# Patient Record
Sex: FEMALE | Race: WHITE | NOT HISPANIC OR LATINO | Employment: FULL TIME | URBAN - NONMETROPOLITAN AREA
[De-identification: names, ages, dates, MRNs, and addresses within clinical notes are randomized per-mention and may not be internally consistent; named-entity substitution may affect disease eponyms.]

---

## 2017-01-13 RX ORDER — DROSPIRENONE AND ETHINYL ESTRADIOL 0.03MG-3MG
1 KIT ORAL DAILY
Qty: 28 TABLET | Refills: 6 | Status: SHIPPED | OUTPATIENT
Start: 2017-01-13 | End: 2022-09-13

## 2021-09-09 ENCOUNTER — OFFICE VISIT (OUTPATIENT)
Dept: OBSTETRICS AND GYNECOLOGY | Facility: CLINIC | Age: 41
End: 2021-09-09

## 2021-09-09 VITALS
BODY MASS INDEX: 24.1 KG/M2 | HEIGHT: 63 IN | WEIGHT: 136 LBS | DIASTOLIC BLOOD PRESSURE: 74 MMHG | SYSTOLIC BLOOD PRESSURE: 106 MMHG

## 2021-09-09 DIAGNOSIS — Z78.9 NONSMOKER: ICD-10-CM

## 2021-09-09 DIAGNOSIS — B96.89 BACTERIAL VAGINAL INFECTION: Primary | ICD-10-CM

## 2021-09-09 DIAGNOSIS — N76.0 BACTERIAL VAGINAL INFECTION: Primary | ICD-10-CM

## 2021-09-09 DIAGNOSIS — Z12.4 CERVICAL CANCER SCREENING: ICD-10-CM

## 2021-09-09 PROCEDURE — G0123 SCREEN CERV/VAG THIN LAYER: HCPCS | Performed by: OBSTETRICS & GYNECOLOGY

## 2021-09-09 PROCEDURE — 87624 HPV HI-RISK TYP POOLED RSLT: CPT | Performed by: OBSTETRICS & GYNECOLOGY

## 2021-09-09 PROCEDURE — 99202 OFFICE O/P NEW SF 15 MIN: CPT | Performed by: OBSTETRICS & GYNECOLOGY

## 2021-09-09 RX ORDER — NITROFURANTOIN 25; 75 MG/1; MG/1
100 CAPSULE ORAL DAILY
COMMUNITY
Start: 2021-08-03 | End: 2022-09-13

## 2021-09-09 RX ORDER — VENLAFAXINE 75 MG/1
75 TABLET ORAL DAILY
COMMUNITY
End: 2022-09-13

## 2021-09-09 NOTE — PROGRESS NOTES
"Subjective   Chief Complaint   Patient presents with   • Vaginal Discharge     pt here to f/u on recurring yeast and bacterial infections, c/o mostly of vaginal odor, has been on Diflucan and Flagyl with good improvement,  reports it is worsened with intercourse     Rosio Kingston is a 40 y.o. year old .  Patient's last menstrual period was 2021 (exact date).  She presents to be seen because of recurring vaginal infections.  Patient has a new boyfriend and reports that she got BV after they had been sexually active for about two months.  Patient says that it is better if she does not allow boyfriend to ejaculate inside her.  She was treated, and has subsequently had BV two more times.  She also reports that every time she takes antibiotics for the BV, she then ends up with a yeast infection.    Patient is not having any symptoms right now, but will have burning, itching, and a swollen feeling every time she has an infection.     Patient reports periods are normal.  Menses are regular on OCP's, with no breakthrough bleeding.    The following portions of the patient's history were reviewed and updated as appropriate:current medications and allergies    Social History    Tobacco Use      Smoking status: Never Smoker      Smokeless tobacco: Never Used    Review of Systems   Constitutional: Negative for activity change and unexpected weight change.   Respiratory: Negative for shortness of breath.    Cardiovascular: Negative for chest pain.   Genitourinary: Negative for dyspareunia, frequency, urgency, vaginal bleeding, vaginal discharge and vaginal pain.         Objective   /74 (BP Location: Right arm, Patient Position: Sitting, Cuff Size: Adult)   Ht 160 cm (63\")   Wt 61.7 kg (136 lb)   LMP 2021 (Exact Date)   Breastfeeding No   BMI 24.09 kg/m²     Physical Exam  Vitals and nursing note reviewed. Exam conducted with a chaperone present.   Constitutional:       General: She is not in " acute distress.     Appearance: She is well-developed.   HENT:      Head: Normocephalic and atraumatic.   Neck:      Thyroid: No thyromegaly.   Pulmonary:      Effort: Pulmonary effort is normal.   Abdominal:      General: There is no distension.      Palpations: Abdomen is soft.      Tenderness: There is no abdominal tenderness.   Genitourinary:     General: Normal vulva.      Exam position: Lithotomy position.      Labia:         Right: No rash or tenderness.         Left: No rash or tenderness.       Urethra: No prolapse.      Vagina: Normal.      Cervix: Normal.      Uterus: Normal.       Adnexa: Right adnexa normal and left adnexa normal.   Musculoskeletal:         General: Normal range of motion.      Cervical back: Normal range of motion.   Skin:     General: Skin is warm and dry.   Neurological:      Mental Status: She is alert and oriented to person, place, and time.   Psychiatric:         Behavior: Behavior normal.         Judgment: Judgment normal.         Lab Review   No data reviewed    Imaging   No data reviewed     Assessment & Plan  Diagnoses and all orders for this visit:    1. Bacterial vaginal infection (Primary): Patient with recurrent bacterial infections that seem to be completely resolved since her boyfriend started withdrawing.  They are both satisfied with this resolution, although we have discussed vaginal pH suppositories to help with the problem.  The patient has already wearing only cotton underwear, she does not soak in hot water or a hot tub, and she generally wears loose clothes since she is in scrubs all day at work.  General vaginal health discussed, and questions answered.  The patient would like to be seen here annually for GYN care; although she denies any history of abnormal Pap smears, a new Pap was collected today just that we would have one in our system.  If that Pap also returns is normal, she will only need Pap smears every 3 years.  The patient is scheduling an  appointment to return to the office in 1 year for an annual exam.    2. BMI 24.0-24.9, adult    3. Nonsmoker        This note was electronically signed.    Cassie Carnes MD  September 9, 2021  08:55 CDT    Total time spent today with Rosio  was 15-29 minutes (level 2).  Greater than 50% of the time was spent coordinating care, answering her questions and counseling regarding pathophysiology of her presenting problem along with plans for any diagnositc work-up and treatment.

## 2021-09-13 ENCOUNTER — TELEPHONE (OUTPATIENT)
Dept: OBSTETRICS AND GYNECOLOGY | Facility: CLINIC | Age: 41
End: 2021-09-13

## 2021-09-13 LAB
GEN CATEG CVX/VAG CYTO-IMP: NORMAL
HPV I/H RISK 4 DNA CVX QL PROBE+SIG AMP: NOT DETECTED
LAB AP CASE REPORT: NORMAL
LAB AP GYN ADDITIONAL INFORMATION: NORMAL
LAB AP GYN OTHER FINDINGS: NORMAL
PATH INTERP SPEC-IMP: NORMAL
STAT OF ADQ CVX/VAG CYTO-IMP: NORMAL

## 2021-09-13 NOTE — TELEPHONE ENCOUNTER
Pt called office with c/o vaginal itching and discharge. Pt states that she just had a pap and was wanting to get medication sent in. Pt advised to get otc monistat and see if that takes care of the problem. Pt agrees and voices understanding.

## 2021-09-15 ENCOUNTER — TELEPHONE (OUTPATIENT)
Dept: OBSTETRICS AND GYNECOLOGY | Facility: CLINIC | Age: 41
End: 2021-09-15

## 2021-09-16 DIAGNOSIS — B37.31 CANDIDIASIS, VAGINA: Primary | ICD-10-CM

## 2021-09-16 RX ORDER — FLUCONAZOLE 150 MG/1
150 TABLET ORAL DAILY
Qty: 1 TABLET | Refills: 0 | Status: SHIPPED | OUTPATIENT
Start: 2021-09-16 | End: 2022-09-13

## 2022-09-13 ENCOUNTER — OFFICE VISIT (OUTPATIENT)
Dept: OBSTETRICS AND GYNECOLOGY | Facility: CLINIC | Age: 42
End: 2022-09-13

## 2022-09-13 VITALS
SYSTOLIC BLOOD PRESSURE: 102 MMHG | BODY MASS INDEX: 23.74 KG/M2 | HEIGHT: 63 IN | WEIGHT: 134 LBS | DIASTOLIC BLOOD PRESSURE: 74 MMHG

## 2022-09-13 DIAGNOSIS — Z12.31 BREAST CANCER SCREENING BY MAMMOGRAM: ICD-10-CM

## 2022-09-13 DIAGNOSIS — Z78.9 NONSMOKER: ICD-10-CM

## 2022-09-13 DIAGNOSIS — N91.2 AMENORRHEA: Primary | ICD-10-CM

## 2022-09-13 DIAGNOSIS — Z12.4 SCREENING FOR CERVICAL CANCER: ICD-10-CM

## 2022-09-13 DIAGNOSIS — F90.9 ATTENTION DEFICIT HYPERACTIVITY DISORDER (ADHD), UNSPECIFIED ADHD TYPE: ICD-10-CM

## 2022-09-13 DIAGNOSIS — Z01.419 ENCOUNTER FOR GYNECOLOGICAL EXAMINATION WITHOUT ABNORMAL FINDING: ICD-10-CM

## 2022-09-13 PROCEDURE — 99396 PREV VISIT EST AGE 40-64: CPT | Performed by: OBSTETRICS & GYNECOLOGY

## 2022-09-13 PROCEDURE — G0123 SCREEN CERV/VAG THIN LAYER: HCPCS | Performed by: OBSTETRICS & GYNECOLOGY

## 2022-09-13 PROCEDURE — 87624 HPV HI-RISK TYP POOLED RSLT: CPT | Performed by: OBSTETRICS & GYNECOLOGY

## 2022-09-13 RX ORDER — LISDEXAMFETAMINE DIMESYLATE 10 MG/1
CAPSULE ORAL
COMMUNITY
Start: 2022-09-02 | End: 2023-02-14

## 2022-09-13 NOTE — PROGRESS NOTES
Subjective      Rosio Kingston is a 41 y.o. female who presents for her routine annual exam. Overall, patient reports to be feeling well.  Patient weaned off of her Effexor and can tell that she has had increased anxiety.  She started the medication after her   in 2020 and did not feel like she needed it anymore.  She also stopped her combination OCP's (current partner has had a vasectomy).  She had hot flashes and night sweats at first, but that has resolved.  She has not had a period since 2022, three months ago.  While vasomotor symptoms have resolved, she does feel like mood swings have been terrible - and didn't know whether to attribute to menopause or quitting her medication.    Current contraception: vasectomy  Regular self breast exam: no  History of abnormal Pap smear: no  History of abnormal mammogram: no  Exercise: frequently - job requires a lot of walking    The following portions of the patient's history were reviewed and updated as appropriate: allergies, current medications, past family history, past medical history, past social history, past surgical history and problem list.    Heterosexual.  Has been with current partner just over a year.    Family History  Family History   Problem Relation Age of Onset   • Breast cancer Neg Hx    • Ovarian cancer Neg Hx    • Uterine cancer Neg Hx    • Colon cancer Neg Hx        Review of Systems  Review of Systems   Constitutional: Negative for activity change, unexpected weight gain and unexpected weight loss.   Respiratory: Negative for shortness of breath.    Cardiovascular: Negative for chest pain.   Gastrointestinal: Positive for blood in stool (bright red, from hemorrhoids, only occasional). Negative for abdominal distention, abdominal pain, constipation and diarrhea.   Endocrine: Negative for cold intolerance and heat intolerance.   Genitourinary: Positive for amenorrhea (for three months). Negative for decreased libido,  "difficulty urinating, dyspareunia, pelvic pain, urinary incontinence, vaginal discharge and vaginal pain.   Musculoskeletal: Negative for arthralgias and back pain.   Neurological: Positive for headache (just recently, but just started vyvanse and also has a new job). Negative for dizziness.   Psychiatric/Behavioral: Positive for agitation. Negative for depressed mood. The patient is nervous/anxious (mild, intermittent, worse in the evening).              Objective        /74   Ht 160 cm (63\")   Wt 60.8 kg (134 lb)   LMP 07/25/2022 (Exact Date)   BMI 23.74 kg/m²   Physical Exam  Vitals and nursing note reviewed. Exam conducted with a chaperone present.   Constitutional:       General: She is not in acute distress.     Appearance: She is well-developed.   HENT:      Head: Normocephalic and atraumatic.   Cardiovascular:      Rate and Rhythm: Normal rate and regular rhythm.      Heart sounds: No murmur heard.  Pulmonary:      Effort: Pulmonary effort is normal.      Breath sounds: Normal breath sounds.   Chest:   Breasts:      Right: No inverted nipple or mass.      Left: No inverted nipple or mass.       Abdominal:      General: There is no distension.      Palpations: Abdomen is soft.      Tenderness: There is no abdominal tenderness.   Genitourinary:     General: Normal vulva.      Exam position: Lithotomy position.      Labia:         Right: No tenderness or lesion.         Left: No tenderness or lesion.       Vagina: Normal. No vaginal discharge, tenderness or bleeding.      Cervix: No cervical motion tenderness, discharge or friability.      Adnexa:         Right: No tenderness or fullness.          Left: No tenderness or fullness.        Rectum: Normal.   Musculoskeletal:         General: Normal range of motion.      Cervical back: Normal range of motion and neck supple.   Skin:     General: Skin is warm and dry.   Neurological:      Mental Status: She is alert and oriented to person, place, and time. "   Psychiatric:         Behavior: Behavior normal.         Judgment: Judgment normal.               Assessment  & Plan    Diagnoses and all orders for this visit:    1. Amenorrhea (Primary): Patient without menses for 3 months.  While she initially had vasomotor symptoms, those have resolved; and now she simply reports increased mood swings and irritability.  Testing to see whether the patient might be menopausal  -     Estradiol  -     Follicle Stimulating Hormone  -     Progesterone    2. Attention deficit hyperactivity disorder (ADHD), unspecified ADHD type: Patient reports symptoms well controlled with Vyvanse that she started recently    3. Encounter for gynecological examination without abnormal finding: Physical exam unremarkable.  Regular self breast exam encouraged; mammogram ordered.  Routine screening labs and new Pap today.  Patient with some mild anxiety, but is not sure whether that has been related to hormonal changes or recently weaning off of her Effexor.  If the patient's labs show she is menopausal, we will start a low-dose of hormone replacement therapy.  If that is not the case, the patient wants to wait 1 or 2 more months and see if her mood levels out; if not, she will call back and we will try BuSpar at night before she goes to bed.  -     CBC & Differential  -     Comprehensive Metabolic Panel  -     Hemoglobin A1c  -     Lipid Panel  -     TSH    4. Breast cancer screening by mammogram  -     Mammo Screening Bilateral With CAD; Future    5. Nonsmoker    6. BMI 23.0-23.9, adult        This note was electronically signed.    Cassie Carnes MD  9/13/2022  09:15 CDT

## 2022-09-14 LAB
ALBUMIN SERPL-MCNC: 4.6 G/DL (ref 3.5–5.2)
ALBUMIN/GLOB SERPL: 2.1 G/DL
ALP SERPL-CCNC: 82 U/L (ref 39–117)
ALT SERPL-CCNC: 23 U/L (ref 1–33)
AST SERPL-CCNC: 16 U/L (ref 1–32)
BASOPHILS # BLD AUTO: 0.02 10*3/MM3 (ref 0–0.2)
BASOPHILS NFR BLD AUTO: 0.3 % (ref 0–1.5)
BILIRUB SERPL-MCNC: 0.6 MG/DL (ref 0–1.2)
BUN SERPL-MCNC: 11 MG/DL (ref 6–20)
BUN/CREAT SERPL: 13.1 (ref 7–25)
CALCIUM SERPL-MCNC: 10.1 MG/DL (ref 8.6–10.5)
CHLORIDE SERPL-SCNC: 103 MMOL/L (ref 98–107)
CHOLEST SERPL-MCNC: 160 MG/DL (ref 0–200)
CO2 SERPL-SCNC: 24.4 MMOL/L (ref 22–29)
CREAT SERPL-MCNC: 0.84 MG/DL (ref 0.57–1)
EGFRCR-CYS SERPLBLD CKD-EPI 2021: 89.7 ML/MIN/1.73
EOSINOPHIL # BLD AUTO: 0.29 10*3/MM3 (ref 0–0.4)
EOSINOPHIL NFR BLD AUTO: 4.4 % (ref 0.3–6.2)
ERYTHROCYTE [DISTWIDTH] IN BLOOD BY AUTOMATED COUNT: 12.2 % (ref 12.3–15.4)
ESTRADIOL SERPL-MCNC: 144 PG/ML
FSH SERPL-ACNC: 36 MIU/ML
GEN CATEG CVX/VAG CYTO-IMP: NORMAL
GLOBULIN SER CALC-MCNC: 2.2 GM/DL
GLUCOSE SERPL-MCNC: 79 MG/DL (ref 65–99)
HBA1C MFR BLD: 5.4 % (ref 4.8–5.6)
HCT VFR BLD AUTO: 42 % (ref 34–46.6)
HDLC SERPL-MCNC: 52 MG/DL (ref 40–60)
HGB BLD-MCNC: 13.5 G/DL (ref 12–15.9)
HPV I/H RISK 4 DNA CVX QL PROBE+SIG AMP: NOT DETECTED
IMM GRANULOCYTES # BLD AUTO: 0.02 10*3/MM3 (ref 0–0.05)
IMM GRANULOCYTES NFR BLD AUTO: 0.3 % (ref 0–0.5)
LAB AP CASE REPORT: NORMAL
LAB AP GYN ADDITIONAL INFORMATION: NORMAL
LDLC SERPL CALC-MCNC: 92 MG/DL (ref 0–100)
LYMPHOCYTES # BLD AUTO: 2.04 10*3/MM3 (ref 0.7–3.1)
LYMPHOCYTES NFR BLD AUTO: 31 % (ref 19.6–45.3)
Lab: NORMAL
MCH RBC QN AUTO: 29.3 PG (ref 26.6–33)
MCHC RBC AUTO-ENTMCNC: 32.1 G/DL (ref 31.5–35.7)
MCV RBC AUTO: 91.1 FL (ref 79–97)
MONOCYTES # BLD AUTO: 0.55 10*3/MM3 (ref 0.1–0.9)
MONOCYTES NFR BLD AUTO: 8.3 % (ref 5–12)
NEUTROPHILS # BLD AUTO: 3.67 10*3/MM3 (ref 1.7–7)
NEUTROPHILS NFR BLD AUTO: 55.7 % (ref 42.7–76)
NRBC BLD AUTO-RTO: 0 /100 WBC (ref 0–0.2)
PATH INTERP SPEC-IMP: NORMAL
PLATELET # BLD AUTO: 345 10*3/MM3 (ref 140–450)
POTASSIUM SERPL-SCNC: 4.4 MMOL/L (ref 3.5–5.2)
PROGEST SERPL-MCNC: 0.1 NG/ML
PROT SERPL-MCNC: 6.8 G/DL (ref 6–8.5)
RBC # BLD AUTO: 4.61 10*6/MM3 (ref 3.77–5.28)
SODIUM SERPL-SCNC: 139 MMOL/L (ref 136–145)
STAT OF ADQ CVX/VAG CYTO-IMP: NORMAL
TRIGL SERPL-MCNC: 86 MG/DL (ref 0–150)
TSH SERPL DL<=0.005 MIU/L-ACNC: 1.85 UIU/ML (ref 0.27–4.2)
VLDLC SERPL CALC-MCNC: 16 MG/DL (ref 5–40)
WBC # BLD AUTO: 6.59 10*3/MM3 (ref 3.4–10.8)

## 2022-09-28 ENCOUNTER — APPOINTMENT (OUTPATIENT)
Dept: MAMMOGRAPHY | Facility: HOSPITAL | Age: 42
End: 2022-09-28

## 2022-10-05 ENCOUNTER — APPOINTMENT (OUTPATIENT)
Dept: MAMMOGRAPHY | Facility: HOSPITAL | Age: 42
End: 2022-10-05

## 2022-10-12 ENCOUNTER — HOSPITAL ENCOUNTER (OUTPATIENT)
Dept: MAMMOGRAPHY | Facility: HOSPITAL | Age: 42
Discharge: HOME | End: 2022-10-12
Admitting: OBSTETRICS & GYNECOLOGY

## 2022-10-12 DIAGNOSIS — Z12.31 BREAST CANCER SCREENING BY MAMMOGRAM: ICD-10-CM

## 2022-10-12 DIAGNOSIS — N95.1 PERIMENOPAUSE: Primary | ICD-10-CM

## 2022-10-12 PROCEDURE — 77063 BREAST TOMOSYNTHESIS BI: CPT

## 2022-10-12 PROCEDURE — 77067 SCR MAMMO BI INCL CAD: CPT

## 2022-10-13 LAB
CORTIS SERPL-MCNC: 3 UG/DL
DHEA-S SERPL-MCNC: 64.8 UG/DL (ref 57.3–279.2)
ESTRADIOL SERPL-MCNC: 115 PG/ML
FSH SERPL-ACNC: 44.9 MIU/ML
LH SERPL-ACNC: 55.7 MIU/ML
PROGEST SERPL-MCNC: 0.2 NG/ML
TESTOST SERPL-MCNC: <3 NG/DL (ref 4–50)

## 2022-10-24 ENCOUNTER — HOSPITAL ENCOUNTER (OUTPATIENT)
Dept: ULTRASOUND IMAGING | Facility: HOSPITAL | Age: 42
Discharge: HOME | End: 2022-10-24
Admitting: OBSTETRICS & GYNECOLOGY

## 2022-10-24 DIAGNOSIS — R92.8 ABNORMAL MAMMOGRAM: ICD-10-CM

## 2022-10-24 PROCEDURE — 76642 ULTRASOUND BREAST LIMITED: CPT

## 2022-10-26 RX ORDER — NORETHINDRONE ACETATE AND ETHINYL ESTRADIOL AND FERROUS FUMARATE 1MG-20(24)
1 KIT ORAL DAILY
Qty: 28 TABLET | Refills: 12 | Status: SHIPPED | OUTPATIENT
Start: 2022-10-26 | End: 2023-02-14

## 2023-02-14 ENCOUNTER — OFFICE VISIT (OUTPATIENT)
Dept: OBSTETRICS AND GYNECOLOGY | Facility: CLINIC | Age: 43
End: 2023-02-14
Payer: COMMERCIAL

## 2023-02-14 VITALS
WEIGHT: 142 LBS | DIASTOLIC BLOOD PRESSURE: 72 MMHG | HEIGHT: 63 IN | BODY MASS INDEX: 25.16 KG/M2 | SYSTOLIC BLOOD PRESSURE: 98 MMHG

## 2023-02-14 DIAGNOSIS — N93.8 DUB (DYSFUNCTIONAL UTERINE BLEEDING): ICD-10-CM

## 2023-02-14 DIAGNOSIS — N89.8 VAGINAL IRRITATION: Primary | ICD-10-CM

## 2023-02-14 DIAGNOSIS — E66.3 OVERWEIGHT (BMI 25.0-29.9): ICD-10-CM

## 2023-02-14 PROCEDURE — 99213 OFFICE O/P EST LOW 20 MIN: CPT | Performed by: NURSE PRACTITIONER

## 2023-02-14 RX ORDER — CLONAZEPAM 0.5 MG/1
0.5 TABLET ORAL DAILY PRN
COMMUNITY
Start: 2023-01-24

## 2023-02-14 RX ORDER — PENICILLIN V POTASSIUM 500 MG/1
TABLET ORAL
COMMUNITY
Start: 2023-01-25 | End: 2023-03-06

## 2023-02-14 RX ORDER — FUROSEMIDE 20 MG/1
1 TABLET ORAL DAILY
COMMUNITY
Start: 2023-01-26

## 2023-02-14 RX ORDER — SUCRALFATE 1 G/1
TABLET ORAL
COMMUNITY
Start: 2023-01-19

## 2023-02-14 RX ORDER — TESTOSTERONE CYPIONATE, MICRO 100 %
POWDER (GRAM) MISCELLANEOUS
COMMUNITY
Start: 2022-11-11 | End: 2023-02-14

## 2023-02-14 RX ORDER — MULTIVIT WITH MINERALS/LUTEIN
250 TABLET ORAL DAILY
COMMUNITY

## 2023-02-14 RX ORDER — DEXTROAMPHETAMINE SACCHARATE, AMPHETAMINE ASPARTATE, DEXTROAMPHETAMINE SULFATE AND AMPHETAMINE SULFATE 2.5; 2.5; 2.5; 2.5 MG/1; MG/1; MG/1; MG/1
TABLET ORAL
COMMUNITY
Start: 2022-11-11 | End: 2023-03-06

## 2023-02-14 RX ORDER — ONDANSETRON 8 MG/1
TABLET, ORALLY DISINTEGRATING ORAL
COMMUNITY
Start: 2023-01-05

## 2023-02-14 RX ORDER — ESTRADIOL 1 MG/1
TABLET ORAL
COMMUNITY
Start: 2023-02-03 | End: 2023-03-06

## 2023-02-14 RX ORDER — ITRACONAZOLE 100 MG/1
CAPSULE ORAL
COMMUNITY
Start: 2023-01-25 | End: 2023-02-14

## 2023-02-14 RX ORDER — MULTIPLE VITAMINS W/ MINERALS TAB 9MG-400MCG
1 TAB ORAL DAILY
COMMUNITY

## 2023-02-14 RX ORDER — NYSTATIN AND TRIAMCINOLONE ACETONIDE 100000; 1 [USP'U]/G; MG/G
OINTMENT TOPICAL 2 TIMES DAILY
Qty: 30 G | Refills: 0 | Status: SHIPPED | OUTPATIENT
Start: 2023-02-14

## 2023-02-14 NOTE — PROGRESS NOTES
"Chief Complaint  Vaginitis (Pt is here with c/o a possible vaginal infection. Pt states that she has had back to back infections, different ones.  Pt currently c/o itching.  This has all been going on since Oct of last year, pt stopped birth control in Aug.  Did have a period in Nov last year but has not since.  )    Subjective          Rosio Kingston presents to [facility]  History of Present Illness  Possible vaginal infection  Itching since Oct.   Stopped OCP in Aug  Last period 11/2022.         Review of Systems   Constitutional: Negative for activity change, appetite change, fatigue and fever.   Respiratory: Negative for apnea and shortness of breath.    Cardiovascular: Negative for chest pain and palpitations.   Gastrointestinal: Negative for abdominal distention, abdominal pain, constipation, diarrhea, nausea and vomiting.   Endocrine: Negative for cold intolerance and heat intolerance.   Genitourinary: Negative for difficulty urinating, frequency, menstrual problem, pelvic pain and vaginal pain.        Off OCP in August  LMP 11/2022    Multiple types of vaginal infections.      Neurological: Negative for headaches.   Psychiatric/Behavioral: Negative for agitation and sleep disturbance.        Off effexor in approx August             Objective   Vital Signs:   BP 98/72   Ht 160 cm (63\")   Wt 64.4 kg (142 lb)   BMI 25.15 kg/m²     Physical Exam  Exam conducted with a chaperone present.   Constitutional:       Appearance: She is well-developed.   HENT:      Head: Normocephalic.   Neck:      Trachea: No tracheal deviation.   Cardiovascular:      Rate and Rhythm: Normal rate.   Pulmonary:      Breath sounds: Normal breath sounds. No wheezing.   Abdominal:      Palpations: Abdomen is soft.      Tenderness: There is no abdominal tenderness.   Genitourinary:     Exam position: Lithotomy position.      Labia:         Right: Tenderness present. No rash or lesion.         Left: " Tenderness present. No rash or lesion.       Vagina: Vaginal discharge and tenderness present. No erythema.      Cervix: No cervical motion tenderness or discharge.      Uterus: Not deviated, not enlarged and not tender.       Adnexa:         Right: No mass, tenderness or fullness.          Left: No mass, tenderness or fullness.        Rectum: Normal.      Comments: Clark discharge  Skin:     General: Skin is warm and dry.      Findings: No rash.   Neurological:      Mental Status: She is alert and oriented to person, place, and time.   Psychiatric:         Speech: Speech normal.         Behavior: Behavior normal.         Result Review :   The following data was reviewed by: GURVINDER Platt on 02/14/2023:  Multiple reports r/t vaginal infections.                   Assessment and Plan      LMP 11/29/2022  Pt reports she started on Estrace.   Pt states she was not prescribed progesterone.   Pt to return for US to evaluate endo thickness.     Discussed pt symptoms, hx of infections and treatment.   NuSwab collected for BV panel, Candida, Mycoplasma/Ureaplasma, and STD 3 testing.   Mycolog sent to pharmacy.      Diagnoses and all orders for this visit:    1. Vaginal irritation (Primary)  -     NuSwab VG+ - Swab, Vagina  -     Genital Mycoplasmas OMAR, Swab - Swab, Cervix    2. DUB (dysfunctional uterine bleeding)    3. Overweight (BMI 25.0-29.9)    Other orders  -     nystatin-triamcinolone (MYCOLOG) 073796-8.1 UNIT/GM-% ointment; Apply  topically to the appropriate area as directed 2 (Two) Times a Day.  Dispense: 30 g; Refill: 0          BMI is >= 25 and <30. (Overweight) The following options were offered after discussion;: weight loss educational material (shared in after visit summary)       Follow Up   Return for US and OV.    Patient was given instructions and counseling regarding her condition or for health maintenance advice. Please see specific information pulled into the AVS if appropriate.

## 2023-02-18 LAB
A VAGINAE DNA VAG QL NAA+PROBE: ABNORMAL SCORE
BVAB2 DNA VAG QL NAA+PROBE: ABNORMAL SCORE
C ALBICANS DNA VAG QL NAA+PROBE: POSITIVE
C GLABRATA DNA VAG QL NAA+PROBE: NEGATIVE
C TRACH DNA VAG QL NAA+PROBE: NEGATIVE
M GENITALIUM DNA SPEC QL NAA+PROBE: NEGATIVE
M HOMINIS DNA SPEC QL NAA+PROBE: NEGATIVE
MEGA1 DNA VAG QL NAA+PROBE: ABNORMAL SCORE
N GONORRHOEA DNA VAG QL NAA+PROBE: NEGATIVE
T VAGINALIS DNA VAG QL NAA+PROBE: NEGATIVE
UREAPLASMA DNA SPEC QL NAA+PROBE: NEGATIVE

## 2023-02-19 RX ORDER — FLUCONAZOLE 150 MG/1
150 TABLET ORAL EVERY OTHER DAY
Qty: 2 TABLET | Refills: 0 | Status: SHIPPED | OUTPATIENT
Start: 2023-02-19 | End: 2023-02-22

## 2023-02-19 NOTE — PATIENT INSTRUCTIONS

## 2023-03-06 ENCOUNTER — OFFICE VISIT (OUTPATIENT)
Dept: OBSTETRICS AND GYNECOLOGY | Facility: CLINIC | Age: 43
End: 2023-03-06
Payer: COMMERCIAL

## 2023-03-06 VITALS
SYSTOLIC BLOOD PRESSURE: 116 MMHG | WEIGHT: 139 LBS | DIASTOLIC BLOOD PRESSURE: 74 MMHG | BODY MASS INDEX: 24.63 KG/M2 | HEIGHT: 63 IN

## 2023-03-06 DIAGNOSIS — Z30.019 ENCOUNTER FOR INITIAL PRESCRIPTION OF CONTRACEPTIVES, UNSPECIFIED CONTRACEPTIVE: ICD-10-CM

## 2023-03-06 DIAGNOSIS — N89.8 VAGINAL DISCHARGE: Primary | ICD-10-CM

## 2023-03-06 LAB
B-HCG UR QL: NEGATIVE
EXPIRATION DATE: NORMAL
INTERNAL NEGATIVE CONTROL: NEGATIVE
INTERNAL POSITIVE CONTROL: POSITIVE
Lab: NORMAL

## 2023-03-06 PROCEDURE — 81025 URINE PREGNANCY TEST: CPT | Performed by: NURSE PRACTITIONER

## 2023-03-06 PROCEDURE — 1160F RVW MEDS BY RX/DR IN RCRD: CPT | Performed by: NURSE PRACTITIONER

## 2023-03-06 PROCEDURE — 1159F MED LIST DOCD IN RCRD: CPT | Performed by: NURSE PRACTITIONER

## 2023-03-06 PROCEDURE — 99213 OFFICE O/P EST LOW 20 MIN: CPT | Performed by: NURSE PRACTITIONER

## 2023-03-06 RX ORDER — NORETHINDRONE ACETATE AND ETHINYL ESTRADIOL 1MG-20(21)
1 KIT ORAL DAILY
Qty: 84 TABLET | Refills: 0 | Status: SHIPPED | OUTPATIENT
Start: 2023-03-06

## 2023-03-06 NOTE — PROGRESS NOTES
Chief Complaint  Gynecologic Exam (Pt is here for a f/u with US to evaluate endo thickness due to being on estrace.  PT was to stop Estrace at last appointment. Pt states she is still having symptoms of a yeast infection.  )    Subjective          Rosio Kingston presents to [facility]  History of Present Illness  Follow-up  Ultrasound today related to unopposed estrogen      She denies menstrual cycles while taking the estrogen.  The patient only took estrogen for 4 weeks without a break week.   Prior to taking estrogen her menstrual cycle occurred every 3 months.   She has had 3 menstrual cycles since 08/2023.   Prior to starting estrogen, the patient had hot flashes and night sweats, they resolved when she started taking estrogen and have not returned since stopping estrogen.   Her last menstrual cycle began on 02/15/2023.  She reports intermittent episodes of hot flashes starting in 08/2022 and ending in 01/2023. She notes that they resolve with estrogen use.   She notes mood changes and not being very pleasant. She is okay with birth control.    The patient has had vaginal discharge for 6 months  She has used an over the counter 3-day medication as well as a prescription Diflucan and her symptoms have persisted.   Patient has noticed minor improvement with the use of topical prescription.     Review of Systems   Constitutional: Negative for activity change, appetite change, fatigue and fever.   Respiratory: Negative for apnea and shortness of breath.    Cardiovascular: Negative for chest pain and palpitations.   Gastrointestinal: Negative for abdominal distention, abdominal pain, constipation, diarrhea, nausea and vomiting.   Endocrine: Negative for cold intolerance and heat intolerance.   Genitourinary: Positive for menstrual problem and vaginal discharge. Negative for difficulty urinating, frequency, pelvic pain and vaginal pain.   Neurological: Negative for headaches.  "  Psychiatric/Behavioral: Negative for agitation and sleep disturbance.         Objective   Vital Signs:   /74   Ht 160 cm (63\")   Wt 63 kg (139 lb)   BMI 24.62 kg/m²     Physical Exam  Constitutional:       Appearance: She is well-developed.   HENT:      Head: Normocephalic.   Neck:      Trachea: No tracheal deviation.   Cardiovascular:      Rate and Rhythm: Normal rate.   Pulmonary:      Breath sounds: Normal breath sounds. No wheezing.   Abdominal:      Palpations: Abdomen is soft.      Tenderness: There is no abdominal tenderness.   Genitourinary:     Labia:         Right: No rash, tenderness or lesion.         Left: No rash, tenderness or lesion.       Vagina: Vaginal discharge present. No erythema or tenderness.      Cervix: No cervical motion tenderness or discharge.      Uterus: Not deviated, not enlarged and not tender.       Adnexa:         Right: No mass, tenderness or fullness.          Left: No mass, tenderness or fullness.        Rectum: Normal.      Comments: Yeasty appearance   Skin:     General: Skin is warm and dry.      Findings: No rash.   Neurological:      Mental Status: She is alert and oriented to person, place, and time.   Psychiatric:         Speech: Speech normal.         Behavior: Behavior normal.         Result Review :                       Assessment and Plan      Reviewed US report and discussed with pt.   US report indicates uterus 7. 00 cm, endometrial thickness 1.18 cm, two complex or resolving cysts on right ovary.   Pt to return for repeat US in 6 wks.     Discussed pt hot flashes and options for treatment.   UPT negative  Pt opts to restart OCP.   Instructed pt about beginning OCP, side effects and S&S to report. Pt voiced understanding.    Culture sent to lab.   Prescribe 7-day vaginal terconazole  Pending lab results prescribe course of multiple day multiple week course of Diflucan     Diagnoses and all orders for this visit:    1. Vaginal discharge (Primary)  -     " Genital Mycoplasmas OMAR, Swab - Swab, Cervix  -     NuSwab VG+ - Swab, Vagina    2. Encounter for initial prescription of contraceptives, unspecified contraceptive  -     POC Pregnancy, Urine    Other orders  -     norethindrone-ethinyl estradiol FE (Microgestin FE 1/20) 1-20 MG-MCG per tablet; Take 1 tablet by mouth Daily.  Dispense: 84 tablet; Refill: 0  -     terconazole (TERAZOL 7) 0.4 % vaginal cream; Insert 1 applicator into the vagina Every Night for 7 days.  Dispense: 45 g; Refill: 0          BMI is within normal parameters. No other follow-up for BMI required.       Follow Up   Return for med check, Video visit.    Patient was given instructions and counseling regarding her condition or for health maintenance advice. Please see specific information pulled into the AVS if appropriate.       Transcribed from ambient dictation for GURVINDER Platt by Mirna Lee.  03/07/23   16:13 CST    Patient or patient representative verbalized consent to the visit recording.  I have personally performed the services described in this document as transcribed by the above individual, and it is both accurate and complete.  GURVINDER Platt  3/7/2023  18:47 CST

## 2023-03-08 NOTE — PATIENT INSTRUCTIONS

## 2023-03-27 RX ORDER — FLUCONAZOLE 100 MG/1
100 TABLET ORAL WEEKLY
Qty: 4 TABLET | Refills: 0 | Status: SHIPPED | OUTPATIENT
Start: 2023-03-27

## 2023-04-18 ENCOUNTER — OFFICE VISIT (OUTPATIENT)
Dept: OBSTETRICS AND GYNECOLOGY | Facility: CLINIC | Age: 43
End: 2023-04-18
Payer: COMMERCIAL

## 2023-04-18 VITALS
WEIGHT: 141 LBS | DIASTOLIC BLOOD PRESSURE: 64 MMHG | SYSTOLIC BLOOD PRESSURE: 100 MMHG | HEIGHT: 63 IN | BODY MASS INDEX: 24.98 KG/M2

## 2023-04-18 DIAGNOSIS — N83.209 CYST OF OVARY, UNSPECIFIED LATERALITY: Primary | ICD-10-CM

## 2023-04-18 DIAGNOSIS — N76.1 CHRONIC VAGINITIS: ICD-10-CM

## 2023-04-18 RX ORDER — FLUCONAZOLE 100 MG/1
100 TABLET ORAL 2 TIMES WEEKLY
Qty: 16 TABLET | Refills: 0 | Status: SHIPPED | OUTPATIENT
Start: 2023-04-20

## 2023-04-18 RX ORDER — FLUTICASONE PROPIONATE 50 MCG
SPRAY, SUSPENSION (ML) NASAL
COMMUNITY
Start: 2023-04-06

## 2023-04-18 RX ORDER — PREDNISONE 10 MG/1
TABLET ORAL
COMMUNITY
Start: 2023-04-14

## 2023-04-18 NOTE — PROGRESS NOTES
Chief Complaint  Follow-up (Patient here to f/u on ovarian cyst dx on 3/6/23 and to f/u on OCP. Patient states that she has reoccuring yeast infections. Patient states that she thinks she had one on Saturday. She states she used a boric acid and haven't really gotten any relief. Patient states that she did start her period. She states that her hormone symptoms have subsided due to OCP. Patient had u/s in office today.)    Subjective          Rosio Kingston presents to [facility]  History of Present Illness    The patient presents for follow-up.     She got an yeast infection.   She feels every time she has intercourse, she gets a yeast infection.   She had intercourse on 04/08/2023.  She started developing bad vaginal itchiness on 04/15/2023.  She used Mycolog.   She used boric acid suppositories.   She has not changed anything.   She has significantly decreased her sugars.   She intakes a good amount of water.     The patient eats breakfast bars for breakfast.   The patient eats cheese wrapped in meat, no bread, with pretzels and peanut butter for lunch.  Her and her son eat fast food for dinner for the most part.   She does cook at home occasionally.   She drinks water throughout the day.   She eats healthier now than she used to.   She denies eating yogurt.   She takes probiotics every morning consistently.     The patient exercises by walking on a regular basis.     The patient was only on steroids for back pain.       Review of Systems   Constitutional: Negative for activity change, appetite change, fatigue and fever.   Respiratory: Negative for apnea and shortness of breath.    Cardiovascular: Negative for chest pain and palpitations.   Gastrointestinal: Negative for abdominal distention, abdominal pain, constipation, diarrhea, nausea and vomiting.   Endocrine: Negative for cold intolerance and heat intolerance.   Genitourinary: Negative for difficulty urinating, frequency,  "menstrual problem, pelvic pain and vaginal pain.        Reoccurring yeast infection     Neurological: Negative for headaches.   Psychiatric/Behavioral: Negative for agitation and sleep disturbance.         Objective   Vital Signs:   /64 (BP Location: Left arm, Patient Position: Sitting, Cuff Size: Adult)   Ht 160 cm (63\")   Wt 64 kg (141 lb)   BMI 24.98 kg/m²     Physical Exam  Vitals reviewed.   Constitutional:       Appearance: She is well-developed.   Eyes:      General:         Right eye: No discharge.         Left eye: No discharge.   Cardiovascular:      Rate and Rhythm: Normal rate and regular rhythm.   Pulmonary:      Effort: Pulmonary effort is normal.      Breath sounds: Normal breath sounds.   Skin:     General: Skin is warm.   Neurological:      Mental Status: She is alert and oriented to person, place, and time.   Psychiatric:         Behavior: Behavior normal.         Thought Content: Thought content normal.         Judgment: Judgment normal.         Result Review :   The following data was reviewed by: GURVINDER Platt on 04/18/2023:    Data reviewed: Radiologic studies transvaginal US                Assessment and Plan      The patient will be prescribed Diflucan 2 times a week for 2 months.   The patient will return in 2 months for follow-up.   If for any reason this does not help, we will send the patient to infectious disease.   The patient was advised to try condoms to see if the symptoms persist.     Reviewed US report and discussed with pt.   US report indicates uterus 6.91 cm, endometrial thickness 1.03 cm, prior right cyst resolved, new right cyst appears simple.      Diagnoses and all orders for this visit:    1. Cyst of ovary, unspecified laterality (Primary)    2. Chronic vaginitis    Other orders  -     fluconazole (Diflucan) 100 MG tablet; Take 1 tablet by mouth 2 (Two) Times a Week.  Dispense: 16 tablet; Refill: 0          BMI is within normal parameters. No other " follow-up for BMI required.       Follow Up   Return in about 2 months (around 6/18/2023) for med check, Recheck.    Patient was given instructions and counseling regarding her condition or for health maintenance advice. Please see specific information pulled into the AVS if appropriate.     Transcribed from ambient dictation for GURVINDER Platt by Aislinn Carrion.  04/18/23   18:36 CDT    Patient or patient representative verbalized consent to the visit recording.  I have personally performed the services described in this document as transcribed by the above individual, and it is both accurate and complete.  GURVINDER Platt  4/29/2023  17:00 CDT

## 2024-05-29 NOTE — TELEPHONE ENCOUNTER
Pt had pap 9/9/2021 and called the office 9/13/2021 with c/o vaginal itching and discharge. Pt states that she has a yeast infection and has used otc monistat with no relief. Can pt have medication sent in for yeast?    Pharm: [facility]- [place], [place]   Patient